# Patient Record
Sex: MALE | Race: WHITE | NOT HISPANIC OR LATINO | Employment: FULL TIME | URBAN - METROPOLITAN AREA
[De-identification: names, ages, dates, MRNs, and addresses within clinical notes are randomized per-mention and may not be internally consistent; named-entity substitution may affect disease eponyms.]

---

## 2017-03-17 ENCOUNTER — ALLSCRIPTS OFFICE VISIT (OUTPATIENT)
Dept: OTHER | Facility: OTHER | Age: 37
End: 2017-03-17

## 2017-03-30 ENCOUNTER — GENERIC CONVERSION - ENCOUNTER (OUTPATIENT)
Dept: OTHER | Facility: OTHER | Age: 37
End: 2017-03-30

## 2017-03-30 ENCOUNTER — ANESTHESIA EVENT (OUTPATIENT)
Dept: GASTROENTEROLOGY | Facility: HOSPITAL | Age: 37
End: 2017-03-30
Payer: COMMERCIAL

## 2017-03-30 LAB
CHOLEST SERPL-MCNC: 187 MG/DL (ref 125–200)
CHOLEST/HDLC SERPL: 4.2 (CALC)
HDLC SERPL-MCNC: 45 MG/DL
LDL CHOLESTEROL DIRECT (HISTORICAL): 121 MG/DL
NON-HDL-CHOL (CHOL-HDL) (HISTORICAL): 142 MG/DL (CALC)
TRIGL SERPL-MCNC: 125 MG/DL

## 2017-03-30 RX ORDER — ONDANSETRON 2 MG/ML
4 INJECTION INTRAMUSCULAR; INTRAVENOUS ONCE
Status: CANCELLED | OUTPATIENT
Start: 2017-03-30 | End: 2017-03-30

## 2017-03-31 ENCOUNTER — ANESTHESIA (OUTPATIENT)
Dept: GASTROENTEROLOGY | Facility: HOSPITAL | Age: 37
End: 2017-03-31
Payer: COMMERCIAL

## 2017-03-31 ENCOUNTER — HOSPITAL ENCOUNTER (OUTPATIENT)
Facility: HOSPITAL | Age: 37
Setting detail: OUTPATIENT SURGERY
Discharge: HOME | End: 2017-03-31
Attending: COLON & RECTAL SURGERY | Admitting: COLON & RECTAL SURGERY
Payer: COMMERCIAL

## 2017-03-31 ENCOUNTER — GENERIC CONVERSION - ENCOUNTER (OUTPATIENT)
Dept: OTHER | Facility: OTHER | Age: 37
End: 2017-03-31

## 2017-03-31 VITALS
RESPIRATION RATE: 16 BRPM | OXYGEN SATURATION: 98 % | DIASTOLIC BLOOD PRESSURE: 62 MMHG | HEART RATE: 75 BPM | TEMPERATURE: 98.6 F | BODY MASS INDEX: 30.48 KG/M2 | HEIGHT: 72 IN | WEIGHT: 225 LBS | SYSTOLIC BLOOD PRESSURE: 122 MMHG

## 2017-03-31 RX ORDER — SODIUM CHLORIDE 9 MG/ML
INJECTION, SOLUTION INTRAVENOUS CONTINUOUS PRN
Status: DISCONTINUED | OUTPATIENT
Start: 2017-03-31 | End: 2017-03-31 | Stop reason: SURG

## 2017-03-31 RX ORDER — PROPOFOL 10 MG/ML
INJECTION, EMULSION INTRAVENOUS AS NEEDED
Status: DISCONTINUED | OUTPATIENT
Start: 2017-03-31 | End: 2017-03-31 | Stop reason: SURG

## 2017-03-31 RX ORDER — SODIUM CHLORIDE 9 MG/ML
125 INJECTION, SOLUTION INTRAVENOUS CONTINUOUS
Status: DISCONTINUED | OUTPATIENT
Start: 2017-03-31 | End: 2017-03-31 | Stop reason: HOSPADM

## 2017-03-31 RX ADMIN — SODIUM CHLORIDE: 0.9 INJECTION, SOLUTION INTRAVENOUS at 10:38

## 2017-03-31 RX ADMIN — SODIUM CHLORIDE 125 ML/HR: 0.9 INJECTION, SOLUTION INTRAVENOUS at 09:20

## 2017-03-31 RX ADMIN — PROPOFOL 100 MG: 10 INJECTION, EMULSION INTRAVENOUS at 10:35

## 2017-03-31 RX ADMIN — PROPOFOL 100 MG: 10 INJECTION, EMULSION INTRAVENOUS at 10:45

## 2017-03-31 RX ADMIN — PROPOFOL 50 MG: 10 INJECTION, EMULSION INTRAVENOUS at 10:50

## 2018-01-11 NOTE — RESULT NOTES
Verified Results  (Q) LIPID PANEL WITH DIRECT LDL [id number] 08:12AM Lera Homans   REPORT COMMENT:  FASTING:YES     Test Name Result Flag Reference   CHOLESTEROL, TOTAL 187 mg/dL  125-200   HDL CHOLESTEROL 45 mg/dL  > OR = 40   TRIGLICERIDES 503 mg/dL  <150   DIRECT  mg/dL  <130   Desirable range <100 mg/dL for patients with CHD or  diabetes and <70 mg/dL for diabetic patients with  known heart disease  CHOL/HDLC RATIO 4 2 (calc)  < OR = 5 0   NON HDL CHOLESTEROL 142 mg/dL (calc)     Target for non-HDL cholesterol is 30 mg/dL higher than   LDL cholesterol target  Plan  Hyperlipidemia    · (1) LIPID PANEL, FASTING; Status:Hold For - Exact Date;  Requested for:Sep 2017;

## 2018-01-12 NOTE — RESULT NOTES
Verified Results  (1) COMPREHENSIVE METABOLIC PANEL [id number] 50:23AZ Zaria Callejas     Test Name Result Flag Reference   GLUCOSE 90 mg/dL  65-99   Fasting reference interval   UREA NITROGEN (BUN) 11 mg/dL  7-25   CREATININE 0 94 mg/dL  0 60-1 35   eGFR NON-AFR   AMERICAN 102 mL/min/1 73m2  > OR = 60   eGFR AFRICAN AMERICAN 119 mL/min/1 73m2  > OR = 60   BUN/CREATININE RATIO   8-36   NOT APPLICABLE (calc)   SODIUM 138 mmol/L  135-146   POTASSIUM 3 9 mmol/L  3 5-5 3   CHLORIDE 103 mmol/L     CARBON DIOXIDE 28 mmol/L  20-31   CALCIUM 9 0 mg/dL  8 6-10 3   PROTEIN, TOTAL 6 9 g/dL  6 1-8 1   ALBUMIN 4 4 g/dL  3 6-5 1   GLOBULIN 2 5 g/dL (calc)  1 9-3 7   ALBUMIN/GLOBULIN RATIO 1 8 (calc)  1 0-2 5   BILIRUBIN, TOTAL 0 7 mg/dL  0 2-1 2   ALKALINE PHOSPHATASE 57 U/L     AST 13 U/L  10-40   ALT 20 U/L  9-46     (1) CBC/PLT/DIFF [id number] 07:21AM Zaria Callejas     Test Name Result Flag Reference   WHITE BLOOD CELL COUNT 7 5 Thousand/uL  3 8-10 8   RED BLOOD CELL COUNT 4 93 Million/uL  4 20-5 80   HEMOGLOBIN 15 0 g/dL  13 2-17 1   HEMATOCRIT 45 3 %  38 5-50 0   MCV 91 9 fL  80 0-100 0   MCH 30 4 pg  27 0-33 0   MCHC 33 1 g/dL  32 0-36 0   RDW 12 6 %  11 0-15 0   PLATELET COUNT 196 Thousand/uL  140-400   MPV 9 8 fL  7 5-11 5   ABSOLUTE NEUTROPHILS 4943 cells/uL  6085-5382   ABSOLUTE LYMPHOCYTES 1650 cells/uL  850-3900   ABSOLUTE MONOCYTES 743 cells/uL  200-950   ABSOLUTE EOSINOPHILS 135 cells/uL     ABSOLUTE BASOPHILS 30 cells/uL  0-200   NEUTROPHILS 65 9 %     LYMPHOCYTES 22 0 %     MONOCYTES 9 9 %     EOSINOPHILS 1 8 %     BASOPHILS 0 4 %       (1) LIPASE [id number] 07:21AM Zaria Callejas   REPORT COMMENT:  FASTING:YES     Test Name Result Flag Reference   LIPASE 21 U/L  7-60     (Q) LIPID PANEL WITH REFLEX TO DIRECT LDL [id number] 07:21AM Zaria Callejas     Test Name Result Flag Reference   CHOLESTEROL, TOTAL 203 mg/dL H 125-200   HDL CHOLESTEROL 41 mg/dL  > OR = 40   TRIGLICERIDES 435 mg/dL  <150 LDL-CHOLESTEROL 133 mg/dL (calc) H <130   Desirable range <100 mg/dL for patients with CHD or  diabetes and <70 mg/dL for diabetic patients with  known heart disease  CHOL/HDLC RATIO 5 0 (calc)  < OR = 5 0   NON HDL CHOLESTEROL 162 mg/dL (calc) H    Target for non-HDL cholesterol is 30 mg/dL higher than   LDL cholesterol target  (Q) CELIAC DISEASE COMPREHENSIVE PANEL [id number] 07:21AM Lemuel Gell     Test Name Result Flag Reference   INTERPRETATION      No serological evidence of celiac disease  tTG IgA may normalize in individuals with celiac disease  who maintain a gluten-free diet  Consider HLA DQ2 and   DQ8 testing to rule out celiac disease   Celiac disease   is extremely rare in the absence of DQ2 or DQ8    TISSUE TRANSGLUTAMINASE$ANTIBODY, IGA 1 U/mL     Value      Interpretation         -----      --------------         <4         No Antibody Detected         > or = 4   Antibody Detected   IMMUNOGLOBULIN A 182 mg/dL       (Q) TSH, 3RD GENERATION W/REFLEX TO FT4 [id number] 07:21AM Lemuel Gell     Test Name Result Flag Reference   TSH W/REFLEX TO FT4 1 08 mIU/L  0 40-4 50     (Q) AMYLASE [id number] 07:21AM Lemuel Gell     Test Name Result Flag Reference   AMYLASE 27 U/L

## 2018-01-14 VITALS
DIASTOLIC BLOOD PRESSURE: 70 MMHG | HEIGHT: 73 IN | BODY MASS INDEX: 29.55 KG/M2 | SYSTOLIC BLOOD PRESSURE: 128 MMHG | HEART RATE: 80 BPM | WEIGHT: 223 LBS

## 2018-01-14 NOTE — PROGRESS NOTES
Assessment    1  Hyperlipidemia (272 4) (E78 5)   2  Irritable bowel syndrome with diarrhea (564 1) (K58 0)    Plan  Hyperlipidemia    · (Q) LIPID PANEL WITH DIRECT LDL; Status:Active; Requested for:17Mar2017;   Irritable bowel syndrome with diarrhea    · 1 - Radha COLON, Miguel Reyes (Gastroenterology) Physician Referral  Consult Only: the  expectation is that the referring provider will communicate back to the patient on  treatment options  Evaluation and Treatment: the expectation is that the referred to  provider will communicate back to the patient on treatment options  Status: Active   Requested for: [id number]  Care Summary provided  : Yes    Discussion/Summary  Impression: healthy adult male  Testicular cancer screening: the risks and benefits of testicular cancer screening were discussed  Screening lab work includes lipid profile  #1  Hyperlipidemia with  as of September 2016  A repeat lipid profile is ordered and the patient will get the lipid panel tomorrow and will be called with those results  #2  Annual physical exam-within normal limits  #3  Irritable bowel syndrome with diarrhea-patient is requesting a GI consult  The treatment plan was reviewed with the patient/guardian  The patient/guardian understands and agrees with the treatment plan     Self Referrals: No      Chief Complaint  Pt  here for annual wellness visit  No concerns noted today  kck      History of Present Illness  HM, Adult Male: The patient is being seen for a health maintenance evaluation  The last health maintenance visit was 1 year(s) ago  General Health: The patient's health since the last visit is described as good  Lifestyle:  He consumes a diverse and healthy diet  Screening:   HPI: This is a 55-year-old male who comes in for his annual physical exam  He is feeling well but has had constant diarrhea almost every day and had been diagnosed in the past with irritable bowel syndrome with diarrhea   He is requesting a colorectal consult and one will be arranged  Reviewing his labs from September his cholesterol numbers were elevated with the  and the total cholesterol 203  A repeat lipid profile will be done and patient will be called with those results  Review of Systems    Constitutional: No fever or chills, feels well, no tiredness, no recent weight gain or weight loss  ENT: no complaints of earache, no hearing loss, no nosebleeds, no nasal discharge, no sore throat, no hoarseness  Cardiovascular: No complaints of slow heart rate, no fast heart rate, no chest pain, no palpitations, no leg claudication, no lower extremity  Respiratory: No complaints of shortness of breath, no wheezing, no cough, no SOB on exertion, no orthopnea or PND  Gastrointestinal: as noted in HPI, no abdominal pain, no vomiting, no constipation and no blood in stools    The patient presents with complaints of sudden onset of frequent episodes of moderate diarrhea, described as loose  Genitourinary: No complaints of dysuria, no incontinence, no hesitancy, no nocturia, no genital lesion, no testicular pain  Active Problems    1  Edema of foot (782 3) (R60 0)   2  Irritable bowel syndrome with diarrhea (564 1) (K58 0)   3  Pain In Flank (789 09)   4  Right flank pain (789 09) (R10 9)   5  Right upper quadrant abdominal pain of unknown etiology (789 01) (R10 11)   6   Screening for heart disease (V81 2) (Z13 6)    Past Medical History    · History of hematuria (V13 09) (Z87 448)   · History of hyperlipidemia (V12 29) (Z86 39)   · History of Testicular Lump (608 89)    Family History  Mother    · Family history of Anxiety Disorder NOS   · Family history of Depression  Father    · Family history of Diabetes Mellitus (V18 0)   · Family history of Hypertension (V17 49)  Paternal Grandmother    · Family history of Reported Family History Of Kidney Disease  Paternal Grandfather    · Family history of Coronary Artery Disease (V17 49)    Social History    · Being A Social Drinker   · Never a smoker    Allergies    1  No Known Drug Allergies    Vitals   Recorded: [id number] 02:56PM   Heart Rate 80   Systolic 619, LUE, Sitting   Diastolic 70, LUE, Sitting   Height 6 ft 1 in   Weight 223 lb    BMI Calculated 29 42   BSA Calculated 2 25     Physical Exam    Constitutional   General appearance: No acute distress, well appearing and well nourished  Head and Face   Head and face: Normal     Palpation of the face and sinuses: No sinus tenderness  Eyes   Conjunctiva and lids: No erythema, swelling or discharge  Pupils and irises: Equal, round, reactive to light  Ears, Nose, Mouth, and Throat   External inspection of ears and nose: Normal     Otoscopic examination: Tympanic membranes translucent with normal light reflex  Canals patent without erythema  Nasal mucosa, septum, and turbinates: Normal without edema or erythema  Lips, teeth, and gums: Normal, good dentition  Oropharynx: Normal with no erythema, edema, exudate or lesions  Neck   Neck: Supple, symmetric, trachea midline, no masses  Thyroid: Normal, no thyromegaly  Pulmonary   Auscultation of lungs: Clear to auscultation  Cardiovascular   Auscultation of heart: Normal rate and rhythm, normal S1 and S2, no murmurs  Abdominal aorta: Normal     Examination of extremities for edema and/or varicosities: Normal     Abdomen   Abdomen: Non-tender, no masses  Liver and spleen: No hepatomegaly or splenomegaly  Examination for hernias: No hernias appreciated  Psychiatric   Orientation to person, place and time: Normal     Mood and affect: Normal        Results/Data  PHQ-2 Adult Depression Screening [id number] 02:59PM User, s     Test Name Result Flag Reference   PHQ-2 Adult Depression Score 0     Over the last two weeks, how often have you been bothered by any of the following problems?   Little interest or pleasure in doing things: Not at all - 0  Feeling down, depressed, or hopeless: Not at all - 0   PHQ-2 Adult Depression Screening Negative         Signatures   Electronically signed by : [facility]; Mar 17 2017  3:37PM EST                       (Author)    Electronically signed by :  LYDIA Villatoro ; Mar 17 2017  4:31PM EST

## 2018-01-17 NOTE — RESULT NOTES
similar imaging characteristics  No hydronephrosis or hydroureter  No urinary tract calculi  No perinephric collection  URINARY BLADDER: No bladder wall mass  No calculi  LUNG BASES:  Unremarkable  LIVER/BILIARY TREE: Unremarkable  GALLBLADDER: No calcified gallstones  No pericholecystic inflammatory change  SPLEEN: Unremarkable  PANCREAS: Unremarkable  ADRENAL GLANDS: Unremarkable  PELVIS     REPRODUCTIVE ORGANS: No suspicious findings  Surgical clips noted related to prior vasectomy  APPENDIX: Normal     ADDITIONAL ABDOMINAL AND PELVIC STRUCTURES     STOMACH AND SMALL BOWEL: Unremarkable  ABDOMINOPELVIC CAVITY: No ascites or free intraperitoneal air  No lymphadenopathy  OSSEOUS STRUCTURES: No acute fracture or destructive osseous lesion  IMPRESSION:     Small bilateral benign-appearing renal cysts  No urolithiasis or hydronephrosis or solid enhancing masses are seen              Workstation performed: [id number]     Signed by:   Jeffery Weston MD   10/28/16

## 2018-02-15 ENCOUNTER — OFFICE VISIT (OUTPATIENT)
Dept: FAMILY MEDICINE CLINIC | Facility: CLINIC | Age: 38
End: 2018-02-15
Payer: COMMERCIAL

## 2018-02-15 VITALS
SYSTOLIC BLOOD PRESSURE: 108 MMHG | WEIGHT: 223 LBS | RESPIRATION RATE: 20 BRPM | BODY MASS INDEX: 30.2 KG/M2 | HEIGHT: 72 IN | DIASTOLIC BLOOD PRESSURE: 68 MMHG | TEMPERATURE: 97.5 F | HEART RATE: 80 BPM

## 2018-02-15 DIAGNOSIS — J06.9 URI WITH COUGH AND CONGESTION: Primary | ICD-10-CM

## 2018-02-15 PROBLEM — E78.5 HYPERLIPIDEMIA: Status: ACTIVE | Noted: 2017-03-17

## 2018-02-15 PROCEDURE — 99213 OFFICE O/P EST LOW 20 MIN: CPT | Performed by: FAMILY MEDICINE

## 2018-02-15 PROCEDURE — 3008F BODY MASS INDEX DOCD: CPT | Performed by: FAMILY MEDICINE

## 2018-02-15 NOTE — PROGRESS NOTES
Assessment/Plan:  1   URI with cough, possibly status post influenza, some metolazone was fully resolved  No need for Tamiflu or antibacterial   Patient use Claritin-D for nasal congestion and runny nose and may use Mucinex D for chest congestion and cough  Increased p o  fluids return to office in 1-2 weeks if still symptoms    No problem-specific Assessment & Plan notes found for this encounter  Diagnoses and all orders for this visit:    URI with cough and congestion  -     dextromethorphan-guaifenesin (MUCINEX DM)  MG per 12 hr tablet; Take 1 tablet by mouth every 12 (twelve) hours  -     loratadine-pseudoephedrine (CLARITIN-D 12-HOUR) 5-120 mg per tablet; Take 1 tablet by mouth 2 (two) times a day          Subjective:      Patient ID: Brian Otero is a 42 y o  male  Cc: Fatigue, cough, congestion x 1 week  Pt states he was sick over a week ago and feels a lot better but not all better but still has congestion and cough  R Sean    Patient had fever chills cough in myalgias last week  Almost all symptoms have resolved he just has a mild dry cough and some head congestion at the present time but no fever chills or myalgias now  The following portions of the patient's history were reviewed and updated as appropriate: allergies, current medications, past family history, past medical history, past social history, past surgical history and problem list     Review of Systems   Constitutional:        HPI   HENT:        HPI   Eyes: Negative  Respiratory:        HPI   Cardiovascular: Negative  Gastrointestinal: Negative  Endocrine: Negative  Genitourinary: Negative  Musculoskeletal:        HPI   Skin: Negative  Allergic/Immunologic: Negative  Neurological: Negative  Hematological: Negative  Psychiatric/Behavioral: Negative            Objective:    Vitals:    02/15/18 1757   BP: 108/68   Pulse: 80   Resp: 20   Temp: 97 5 °F (36 4 °C)        Physical Exam   Constitutional: He is oriented to person, place, and time  He appears well-developed and well-nourished  HENT:   Head: Normocephalic and atraumatic  Left Ear: External ear normal    Mouth/Throat: No oropharyngeal exudate  Mildly boggy turbinates negative sinus tenderness to percussion minimal clear postnasal drip no injection no exudate negative cervical adenopathy   Eyes: Conjunctivae are normal    Cardiovascular: Normal rate, regular rhythm and normal heart sounds  Pulmonary/Chest: Effort normal and breath sounds normal    Musculoskeletal: He exhibits no edema  Lymphadenopathy:     He has no cervical adenopathy  Neurological: He is alert and oriented to person, place, and time  Skin: Skin is warm and dry  Psychiatric: He has a normal mood and affect

## 2020-01-20 ENCOUNTER — TELEPHONE (OUTPATIENT)
Dept: FAMILY MEDICINE CLINIC | Facility: CLINIC | Age: 40
End: 2020-01-20

## 2020-03-05 NOTE — TELEPHONE ENCOUNTER
I spoke to pt's wife Shante Trujillo and she states they moved to [place] 18 months ago and will no longer need our servicves

## 2020-03-16 NOTE — TELEPHONE ENCOUNTER
03/15/20 8:52 PM     Thank you for your request  Your request has been received, reviewed, and the patient chart updated  The PCP has successfully been removed with a patient attribution note  This message will now be completed      Thank you  Js Henson